# Patient Record
Sex: FEMALE | Race: WHITE | NOT HISPANIC OR LATINO | Employment: FULL TIME | URBAN - METROPOLITAN AREA
[De-identification: names, ages, dates, MRNs, and addresses within clinical notes are randomized per-mention and may not be internally consistent; named-entity substitution may affect disease eponyms.]

---

## 2023-10-16 PROBLEM — M18.11 PRIMARY OSTEOARTHRITIS OF FIRST CARPOMETACARPAL JOINT OF RIGHT HAND: Status: ACTIVE | Noted: 2018-01-09

## 2023-10-16 PROBLEM — N32.81 DETRUSOR INSTABILITY: Status: ACTIVE | Noted: 2017-03-28

## 2023-10-16 PROBLEM — R39.14 FEELING OF INCOMPLETE BLADDER EMPTYING: Status: ACTIVE | Noted: 2017-03-28

## 2023-10-16 RX ORDER — PANTOPRAZOLE SODIUM 40 MG/1
40 TABLET, DELAYED RELEASE ORAL
COMMUNITY
Start: 2015-11-25

## 2023-10-16 RX ORDER — LEVOTHYROXINE SODIUM 112 UG/1
112 TABLET ORAL
COMMUNITY
Start: 2016-09-16

## 2023-10-16 RX ORDER — DULOXETIN HYDROCHLORIDE 60 MG/1
60 CAPSULE, DELAYED RELEASE ORAL
COMMUNITY
Start: 2015-11-02

## 2023-10-16 RX ORDER — BACLOFEN 10 MG/1
10 TABLET ORAL
COMMUNITY
Start: 2015-11-17 | End: 2023-10-17 | Stop reason: WASHOUT

## 2023-10-16 RX ORDER — ATENOLOL AND CHLORTHALIDONE TABLET 50; 25 MG/1; MG/1
1 TABLET ORAL DAILY
COMMUNITY
Start: 2015-08-27

## 2023-10-17 ENCOUNTER — HOSPITAL ENCOUNTER (OUTPATIENT)
Dept: CARDIOLOGY | Facility: CLINIC | Age: 63
Discharge: HOME | End: 2023-10-17
Payer: COMMERCIAL

## 2023-10-17 ENCOUNTER — OFFICE VISIT (OUTPATIENT)
Dept: CARDIOLOGY | Facility: CLINIC | Age: 63
End: 2023-10-17
Payer: COMMERCIAL

## 2023-10-17 VITALS
OXYGEN SATURATION: 97 % | BODY MASS INDEX: 34.73 KG/M2 | DIASTOLIC BLOOD PRESSURE: 67 MMHG | HEIGHT: 63 IN | HEART RATE: 61 BPM | WEIGHT: 196 LBS | SYSTOLIC BLOOD PRESSURE: 127 MMHG

## 2023-10-17 DIAGNOSIS — R00.2 HEART PALPITATIONS: ICD-10-CM

## 2023-10-17 DIAGNOSIS — R06.09 EXERTIONAL DYSPNEA: Primary | ICD-10-CM

## 2023-10-17 DIAGNOSIS — I10 PRIMARY HYPERTENSION: ICD-10-CM

## 2023-10-17 DIAGNOSIS — R07.89 ATYPICAL CHEST PAIN: ICD-10-CM

## 2023-10-17 PROCEDURE — 99214 OFFICE O/P EST MOD 30 MIN: CPT | Performed by: STUDENT IN AN ORGANIZED HEALTH CARE EDUCATION/TRAINING PROGRAM

## 2023-10-17 PROCEDURE — 1036F TOBACCO NON-USER: CPT | Performed by: STUDENT IN AN ORGANIZED HEALTH CARE EDUCATION/TRAINING PROGRAM

## 2023-10-17 PROCEDURE — 3078F DIAST BP <80 MM HG: CPT | Performed by: STUDENT IN AN ORGANIZED HEALTH CARE EDUCATION/TRAINING PROGRAM

## 2023-10-17 PROCEDURE — 93244 EXT ECG>48HR<7D REV&INTERPJ: CPT | Performed by: INTERNAL MEDICINE

## 2023-10-17 PROCEDURE — 93242 EXT ECG>48HR<7D RECORDING: CPT

## 2023-10-17 PROCEDURE — 3074F SYST BP LT 130 MM HG: CPT | Performed by: STUDENT IN AN ORGANIZED HEALTH CARE EDUCATION/TRAINING PROGRAM

## 2023-10-17 RX ORDER — BISMUTH SUBSALICYLATE 262 MG
1 TABLET,CHEWABLE ORAL DAILY
COMMUNITY

## 2023-10-17 RX ORDER — ALLOPURINOL 100 MG/1
100 TABLET ORAL DAILY
COMMUNITY

## 2023-10-17 RX ORDER — REGADENOSON 0.08 MG/ML
0.4 INJECTION, SOLUTION INTRAVENOUS
Status: CANCELLED | OUTPATIENT
Start: 2023-10-17

## 2023-10-17 RX ORDER — IBUPROFEN 200 MG
200 TABLET ORAL EVERY 6 HOURS
COMMUNITY

## 2023-10-17 ASSESSMENT — ENCOUNTER SYMPTOMS
NEUROLOGICAL NEGATIVE: 1
SYNCOPE: 0
GASTROINTESTINAL NEGATIVE: 1
PND: 0
SHORTNESS OF BREATH: 1
ENDOCRINE NEGATIVE: 1
NEAR-SYNCOPE: 0
PALPITATIONS: 1
HEMATOLOGIC/LYMPHATIC NEGATIVE: 1
MUSCULOSKELETAL NEGATIVE: 1
CONSTITUTIONAL NEGATIVE: 1
ORTHOPNEA: 0
PSYCHIATRIC NEGATIVE: 1
DYSPNEA ON EXERTION: 1
EYES NEGATIVE: 1

## 2023-10-17 NOTE — PROGRESS NOTES
" Cardiology New Patient History and Physical    Reason for referral: Exertional dyspnea    HPI: Wendy Rosa is a 62 y.o.  female who presents today for exertional dyspnea. Past medical history of osteoarthritis of knee s/p right total knee arthroplasty, chronic lower back pain; L4 and L5 Laminectomy with L4 to S1 pedicle screw fusion (4/2017).     Patient presented to cardiology clinic on 10/17/23 .  Patient notes exertional dyspnea over past ~ 1 year. Worse with activity, relieved by rest; last only for 3-5 minutes.  Occasionally has \"pinching\" chest pain; no clear relation to dyspnea episodes.  Rare heart palpitations.  Per patient she was told she had a prior history of \"mitral valve issue\" on prior heart ultrasound.  Never a smoker.        Past Medical History:   She has a past medical history of Chronic lower back pain, Exertional shortness of breath, Osteoarthritis, and Primary hypertension.    Surgical History:   She has a past surgical history that includes Laminectomy (N/A); Wrist surgery; and Total knee arthroplasty (Right).    Family History:   Family History   Problem Relation Name Age of Onset    Other (enlarged heart) Mother      Other (pacemaker) Mother      Gin Parkinson White syndrome Son         Allergies:  Penicillins and Shellfish containing products     Social History:   - non-smoker; social alcohol use; no illicit drug use    Prior Cardiovascular Testing (personally reviewed):     Stress echo 8/2022- non-diagnostic; target heart rate not achieved; LVEF 60-65%    Review of Systems:  Review of Systems   Constitutional: Negative.   HENT: Negative.     Eyes: Negative.    Cardiovascular:  Positive for chest pain, dyspnea on exertion and palpitations. Negative for near-syncope, orthopnea, paroxysmal nocturnal dyspnea and syncope.   Respiratory:  Positive for shortness of breath.    Endocrine: Negative.    Hematologic/Lymphatic: Negative.    Skin: Negative.    Musculoskeletal: " "Negative.    Gastrointestinal: Negative.    Genitourinary: Negative.    Neurological: Negative.    Psychiatric/Behavioral: Negative.         Objective     Outpatient Medications:    Current Outpatient Medications:     allopurinol (Zyloprim) 100 mg tablet, Take 1 tablet (100 mg) by mouth once daily., Disp: , Rfl:     atenoloL-chlorthalidone (Tenoretic) 50-25 mg tablet, Take 1 tablet by mouth once daily., Disp: , Rfl:     DULoxetine (Cymbalta) 60 mg DR capsule, 1 capsule (60 mg)., Disp: , Rfl:     ibuprofen 200 mg tablet, Take 1 tablet (200 mg) by mouth every 6 hours., Disp: , Rfl:     levothyroxine (Synthroid) 112 mcg tablet, Take 1 tablet (112 mcg) by mouth., Disp: , Rfl:     multivitamin tablet, Take 1 tablet by mouth once daily., Disp: , Rfl:     pantoprazole (ProtoNix) 40 mg EC tablet, Take 1 tablet (40 mg) by mouth., Disp: , Rfl:      Last Recorded Vitals  /67 (BP Location: Left arm, Patient Position: Sitting, BP Cuff Size: Adult)   Pulse 61   Ht 1.6 m (5' 3\")   Wt 88.9 kg (196 lb)   SpO2 97%   BMI 34.72 kg/m²     Physical Exam:  Physical Exam  Constitutional:       Appearance: She is obese.   HENT:      Head: Normocephalic.      Mouth/Throat:      Mouth: Mucous membranes are moist.   Eyes:      Extraocular Movements: Extraocular movements intact.      Conjunctiva/sclera: Conjunctivae normal.   Neck:      Vascular: No carotid bruit.   Cardiovascular:      Rate and Rhythm: Normal rate. Rhythm irregular.      Pulses: Normal pulses.   Pulmonary:      Effort: Pulmonary effort is normal.      Breath sounds: Normal breath sounds. No rhonchi.   Abdominal:      General: Bowel sounds are normal.      Palpations: Abdomen is soft.   Musculoskeletal:      Right lower leg: No edema.      Left lower leg: No edema.   Skin:     General: Skin is warm and dry.   Neurological:      General: No focal deficit present.      Mental Status: She is alert.      Cranial Nerves: No cranial nerve deficit.      Motor: No weakness. "   Psychiatric:         Mood and Affect: Mood normal.         Behavior: Behavior normal.         Lab Review:    TSH (7/31/2023)- 0.533 (within normal limits)     Serum creatinine (7/2023)- 0.77    Lipid panel (7/14/2023)- total 186, HDL 39, , triglycerides 120 mg/dl    Assessment:   62 y.o.  female who presents today for exertional dyspnea. Past medical history of osteoarthritis of knee s/p right total knee arthroplasty, chronic lower back pain; L4 and L5 Laminectomy with L4 to S1 pedicle screw fusion (4/2017).     Patient with exertional dyspnea over past year with multiple CV risk factors (HTN, DLD, family hx CVD); recommend further evaluation with pharmacologic NM stress (unable to exercise vigorously due to knee osteoarthritis); patient agreeable.     Regarding palpitations/irregular heart beat per apple watch > 7 day holter monitor; check TTE.      Overall Plan:  1. Exertional dyspnea; atypical chest pain- pharmacology NM stress as above; TTE    2. Heart palpitations- 7 day holter monitor; TTE    3. DLD- goal LDL < 100 mg/dl; continue dietary and lifestyle modifications; consider statin therapy if not at goal    4. Primary Hypertension- continue anti-HTN therapy with atenoloL-chlorthalidone; if additional anti-HTN therapy needed consider amlodipine     Disposition: Return to cardiology clinic after above testing     Irvin Ruggiero MD

## 2023-10-17 NOTE — PATIENT INSTRUCTIONS
For your shortness of breath with activity; we will check a heart stress test (medicine based stress test as you are unable to exercise vigorously on a treadmill due to your knee arthritis)    For your heart palpitations we will check a heart monitor and heart ultrasound given your reported history of possible mitral valve issue.      Thank you for your visit today. Please contact our office (via [employer] or phone) with any additional questions.     [facility] [place]    Rosamaria, STACY/Clinic Nurse for:    Dr. Bahman Garcia    [address], [address]  [place] [address]    Phone: [phone] Press Option 5 then Option 3 to speak with the Clinic Nurse (Rosamaria)    _____    To Reach:    Billing Questions -    [phone]  Scheduling / Rescheduling -  Option 1  Refills / Medication Requests -  Option 3  General Office / [place] -  Option 4  Results -     Option 6  Medical Records -    Option 7  Repeat Options -    Option 9

## 2023-11-09 ENCOUNTER — HOSPITAL ENCOUNTER (OUTPATIENT)
Dept: RADIOLOGY | Facility: HOSPITAL | Age: 63
Discharge: HOME | End: 2023-11-09
Payer: COMMERCIAL

## 2023-11-09 ENCOUNTER — APPOINTMENT (OUTPATIENT)
Dept: CARDIOLOGY | Facility: HOSPITAL | Age: 63
End: 2023-11-09
Payer: COMMERCIAL

## 2023-11-09 ENCOUNTER — HOSPITAL ENCOUNTER (OUTPATIENT)
Dept: CARDIOLOGY | Facility: HOSPITAL | Age: 63
Discharge: HOME | End: 2023-11-09
Payer: COMMERCIAL

## 2023-11-09 DIAGNOSIS — R06.09 EXERTIONAL DYSPNEA: ICD-10-CM

## 2023-11-09 DIAGNOSIS — R07.89 ATYPICAL CHEST PAIN: ICD-10-CM

## 2023-11-09 PROCEDURE — 78452 HT MUSCLE IMAGE SPECT MULT: CPT | Performed by: STUDENT IN AN ORGANIZED HEALTH CARE EDUCATION/TRAINING PROGRAM

## 2023-11-09 PROCEDURE — 93017 CV STRESS TEST TRACING ONLY: CPT

## 2023-11-09 PROCEDURE — 3430000001 HC RX 343 DIAGNOSTIC RADIOPHARMACEUTICALS: Performed by: STUDENT IN AN ORGANIZED HEALTH CARE EDUCATION/TRAINING PROGRAM

## 2023-11-09 PROCEDURE — 78452 HT MUSCLE IMAGE SPECT MULT: CPT

## 2023-11-09 PROCEDURE — 2500000004 HC RX 250 GENERAL PHARMACY W/ HCPCS (ALT 636 FOR OP/ED): Performed by: STUDENT IN AN ORGANIZED HEALTH CARE EDUCATION/TRAINING PROGRAM

## 2023-11-09 PROCEDURE — 93016 CV STRESS TEST SUPVJ ONLY: CPT | Performed by: STUDENT IN AN ORGANIZED HEALTH CARE EDUCATION/TRAINING PROGRAM

## 2023-11-09 PROCEDURE — A9502 TC99M TETROFOSMIN: HCPCS | Performed by: STUDENT IN AN ORGANIZED HEALTH CARE EDUCATION/TRAINING PROGRAM

## 2023-11-09 PROCEDURE — 93018 CV STRESS TEST I&R ONLY: CPT | Performed by: STUDENT IN AN ORGANIZED HEALTH CARE EDUCATION/TRAINING PROGRAM

## 2023-11-09 PROCEDURE — 93016 CV STRESS TEST SUPVJ ONLY: CPT | Performed by: INTERNAL MEDICINE

## 2023-11-09 RX ORDER — REGADENOSON 0.08 MG/ML
0.4 INJECTION, SOLUTION INTRAVENOUS
Status: COMPLETED | OUTPATIENT
Start: 2023-11-09 | End: 2023-11-09

## 2023-11-09 RX ADMIN — TETROFOSMIN 11.1 MILLICURIE: 0.23 INJECTION, POWDER, LYOPHILIZED, FOR SOLUTION INTRAVENOUS at 08:55

## 2023-11-09 RX ADMIN — TETROFOSMIN 35.8 MILLICURIE: 0.23 INJECTION, POWDER, LYOPHILIZED, FOR SOLUTION INTRAVENOUS at 10:12

## 2023-11-09 RX ADMIN — REGADENOSON 0.4 MG: 0.08 INJECTION, SOLUTION INTRAVENOUS at 10:12

## 2023-11-21 ENCOUNTER — HOSPITAL ENCOUNTER (OUTPATIENT)
Dept: CARDIOLOGY | Facility: CLINIC | Age: 63
Discharge: HOME | End: 2023-11-21
Payer: COMMERCIAL

## 2023-11-21 DIAGNOSIS — R07.89 ATYPICAL CHEST PAIN: ICD-10-CM

## 2023-11-21 DIAGNOSIS — R06.09 EXERTIONAL DYSPNEA: ICD-10-CM

## 2023-11-21 DIAGNOSIS — R06.00 DYSPNEA, UNSPECIFIED: ICD-10-CM

## 2023-11-21 DIAGNOSIS — R00.2 HEART PALPITATIONS: ICD-10-CM

## 2023-11-21 PROCEDURE — 93306 TTE W/DOPPLER COMPLETE: CPT

## 2023-11-21 PROCEDURE — 93306 TTE W/DOPPLER COMPLETE: CPT | Performed by: STUDENT IN AN ORGANIZED HEALTH CARE EDUCATION/TRAINING PROGRAM

## 2023-11-22 LAB
EJECTION FRACTION APICAL 4 CHAMBER: 44.6
EJECTION FRACTION: 50
LEFT ATRIUM VOLUME AREA LENGTH INDEX BSA: 14.2
LEFT VENTRICLE INTERNAL DIMENSION DIASTOLE: 3.4 (ref 3.5–6)
LEFT VENTRICULAR OUTFLOW TRACT DIAMETER: 2
MITRAL VALVE E/A RATIO: 0.69
MITRAL VALVE E/E' RATIO: 7.76

## 2023-12-09 LAB — BODY SURFACE AREA: 1.99 M2

## 2023-12-12 ENCOUNTER — APPOINTMENT (OUTPATIENT)
Dept: CARDIOLOGY | Facility: CLINIC | Age: 63
End: 2023-12-12
Payer: COMMERCIAL

## 2023-12-13 PROBLEM — N95.2 ATROPHIC VAGINITIS: Status: ACTIVE | Noted: 2023-12-13

## 2023-12-13 PROBLEM — L94.0 LOCALIZED MORPHEA: Status: ACTIVE | Noted: 2023-12-13

## 2023-12-13 PROBLEM — F32.A DEPRESSIVE DISORDER: Status: ACTIVE | Noted: 2023-12-13

## 2023-12-13 PROBLEM — M51.9 LUMBAR DISC DISEASE: Status: ACTIVE | Noted: 2023-12-13

## 2023-12-13 PROBLEM — N39.46 MIXED INCONTINENCE: Status: ACTIVE | Noted: 2023-12-13

## 2023-12-13 PROBLEM — R20.2 PARESTHESIA OF RIGHT FOOT: Status: ACTIVE | Noted: 2023-12-13

## 2023-12-13 PROBLEM — M51.37 DEGENERATION OF LUMBAR OR LUMBOSACRAL INTERVERTEBRAL DISC: Status: ACTIVE | Noted: 2023-12-13

## 2023-12-13 PROBLEM — M54.32 SCIATICA OF LEFT SIDE WITHOUT BACK PAIN: Status: ACTIVE | Noted: 2023-12-13

## 2023-12-14 ENCOUNTER — OFFICE VISIT (OUTPATIENT)
Dept: CARDIOLOGY | Facility: CLINIC | Age: 63
End: 2023-12-14
Payer: COMMERCIAL

## 2023-12-14 VITALS
BODY MASS INDEX: 34.55 KG/M2 | SYSTOLIC BLOOD PRESSURE: 118 MMHG | HEART RATE: 70 BPM | WEIGHT: 195 LBS | DIASTOLIC BLOOD PRESSURE: 70 MMHG | HEIGHT: 63 IN | OXYGEN SATURATION: 94 %

## 2023-12-14 DIAGNOSIS — R06.09 EXERTIONAL DYSPNEA: ICD-10-CM

## 2023-12-14 DIAGNOSIS — R00.2 HEART PALPITATIONS: ICD-10-CM

## 2023-12-14 DIAGNOSIS — R07.89 ATYPICAL CHEST PAIN: ICD-10-CM

## 2023-12-14 DIAGNOSIS — I10 PRIMARY HYPERTENSION: ICD-10-CM

## 2023-12-14 PROCEDURE — 1159F MED LIST DOCD IN RCRD: CPT | Performed by: STUDENT IN AN ORGANIZED HEALTH CARE EDUCATION/TRAINING PROGRAM

## 2023-12-14 PROCEDURE — 99213 OFFICE O/P EST LOW 20 MIN: CPT | Performed by: STUDENT IN AN ORGANIZED HEALTH CARE EDUCATION/TRAINING PROGRAM

## 2023-12-14 PROCEDURE — 3078F DIAST BP <80 MM HG: CPT | Performed by: STUDENT IN AN ORGANIZED HEALTH CARE EDUCATION/TRAINING PROGRAM

## 2023-12-14 PROCEDURE — 1160F RVW MEDS BY RX/DR IN RCRD: CPT | Performed by: STUDENT IN AN ORGANIZED HEALTH CARE EDUCATION/TRAINING PROGRAM

## 2023-12-14 PROCEDURE — 3074F SYST BP LT 130 MM HG: CPT | Performed by: STUDENT IN AN ORGANIZED HEALTH CARE EDUCATION/TRAINING PROGRAM

## 2023-12-14 PROCEDURE — 1036F TOBACCO NON-USER: CPT | Performed by: STUDENT IN AN ORGANIZED HEALTH CARE EDUCATION/TRAINING PROGRAM

## 2023-12-14 NOTE — PROGRESS NOTES
" Cardiology Outpatient Follow-up Visit    Reason for visit: follow-up for exertional dyspnea    HPI: Wendy Rosa is a 63 y.o.  female who presents today for a follow-up for exertional dyspnea. Past medical history of osteoarthritis of knee s/p right total knee arthroplasty, chronic lower back pain; L4 and L5 Laminectomy with L4 to S1 pedicle screw fusion (4/2017).     Patient presented to cardiology clinic on 12/14/23 .  Patient notes exertional dyspnea over past ~ 1 year. Worse with activity, relieved by rest; last only for 3-5 minutes.  Occasionally has \"pinching\" chest pain; no clear relation to dyspnea episodes.  Rare heart palpitations.  Per patient she was told she had a prior history of \"mitral valve issue\" on prior heart ultrasound.  Never a smoker.      Patient with exertional dyspnea over past year with multiple CV risk factors (HTN, DLD, family hx CVD); recommended further evaluation with pharmacologic NM stress (unable to exercise vigorously due to knee osteoarthritis); patient agreeable.  Regarding palpitations/irregular heart beat per apple watch > 7 day holter monitor; check TTE.      Wendy returned to cardiology clinic on 12/14/2023.  Stress testing was low risk for ischemia. No significant arrhythmias seen on heart monitor.  TTE showed mild to moderate LVH.  Patient without active cardiovascular complaints at this time.      Past Medical History:   She has a past medical history of Chronic lower back pain, Exertional shortness of breath, Osteoarthritis, and Primary hypertension.    Surgical History:   She has a past surgical history that includes Laminectomy (N/A); Wrist surgery; and Total knee arthroplasty (Right).    Family History:   Family History   Problem Relation Name Age of Onset    Other (enlarged heart) Mother      Other (pacemaker) Mother      Gin Parkinson White syndrome Son         Allergies:  Penicillins and Shellfish containing products     Social History:   - " non-smoker; social alcohol use; no illicit drug use    Prior Cardiovascular Testing (personally reviewed):       TTE (11/21/2023)   1. Left ventricular systolic function is hyperdynamic with a 70-75% estimated ejection fraction.   2. Moderately increased left ventricular septal thickness.   3. The left ventricular posterior wall thickness is moderately increased.   4. Spectral Doppler shows an impaired relaxation pattern of left ventricular diastolic filling.    NM stress (11/9/2023)  1. Negative myocardial perfusion study without evidence of inducible myocardial ischemia or prior infarction.  2. The left ventricle is normal in size.  3. Normal LV wall motion with a post-stress LV EF estimated greater than 65%.    Holter Monitor (10/17/2023):  The predominant rhythm during this holter recording is sinus rhythm with a minimum heart rate of 47 bpm, maximum heart rate 140 bpm, and average heart rate 78 bpm.  No episodes of atrial fibrillation or PSVT.  No episodes of high grade AV block.  No ventricular tachycardia.    Stress echo 8/2022- non-diagnostic; target heart rate not achieved; LVEF 60-65%    Review of Systems:  Review of Systems   Constitutional: Negative.   HENT: Negative.     Eyes: Negative.    Cardiovascular:  Negative for chest pain, dyspnea on exertion, near-syncope, orthopnea, palpitations, paroxysmal nocturnal dyspnea and syncope.   Respiratory: Negative.     Endocrine: Negative.    Hematologic/Lymphatic: Negative.    Skin: Negative.    Musculoskeletal: Negative.    Gastrointestinal: Negative.    Genitourinary: Negative.    Neurological: Negative.    Psychiatric/Behavioral: Negative.     Allergic/Immunologic: Negative.        Objective     Outpatient Medications:    Current Outpatient Medications:     allopurinol (Zyloprim) 100 mg tablet, Take 1 tablet (100 mg) by mouth once daily., Disp: , Rfl:     atenoloL-chlorthalidone (Tenoretic) 50-25 mg tablet, Take 1 tablet by mouth once daily., Disp: , Rfl:      "DULoxetine (Cymbalta) 60 mg DR capsule, 1 capsule (60 mg)., Disp: , Rfl:     ibuprofen 200 mg tablet, Take 1 tablet (200 mg) by mouth every 6 hours., Disp: , Rfl:     levothyroxine (Synthroid) 112 mcg tablet, Take 1 tablet (112 mcg) by mouth., Disp: , Rfl:     multivitamin tablet, Take 1 tablet by mouth once daily., Disp: , Rfl:     pantoprazole (ProtoNix) 40 mg EC tablet, Take 1 tablet (40 mg) by mouth., Disp: , Rfl:      Last Recorded Vitals  /70 (BP Location: Right arm, Patient Position: Sitting, BP Cuff Size: Adult)   Pulse 70   Ht 1.6 m (5' 3\")   Wt 88.5 kg (195 lb)   SpO2 94%   BMI 34.54 kg/m²     Physical Exam:  Physical Exam  Constitutional:       Appearance: She is obese.   HENT:      Head: Normocephalic.      Mouth/Throat:      Mouth: Mucous membranes are moist.   Eyes:      Extraocular Movements: Extraocular movements intact.      Conjunctiva/sclera: Conjunctivae normal.   Neck:      Vascular: No carotid bruit.   Cardiovascular:      Rate and Rhythm: Normal rate. Rhythm irregular.      Pulses: Normal pulses.   Pulmonary:      Effort: Pulmonary effort is normal.      Breath sounds: Normal breath sounds. No rhonchi.   Abdominal:      General: Bowel sounds are normal.      Palpations: Abdomen is soft.   Musculoskeletal:      Right lower leg: No edema.      Left lower leg: No edema.   Skin:     General: Skin is warm and dry.   Neurological:      General: No focal deficit present.      Mental Status: She is alert.      Cranial Nerves: No cranial nerve deficit.      Motor: No weakness.   Psychiatric:         Mood and Affect: Mood normal.         Behavior: Behavior normal.       Lab Review:    TSH (7/31/2023)- 0.533 (within normal limits)     Serum creatinine (7/2023)- 0.77    Lipid panel (7/14/2023)- total 186, HDL 39, , triglycerides 120 mg/dl    Assessment:   62 y.o.  female who presents today for a follow-up visit. Past medical history of osteoarthritis of knee s/p right total knee " arthroplasty, chronic lower back pain; L4 and L5 Laminectomy with L4 to S1 pedicle screw fusion (4/2017).     Patient with exertional dyspnea over past year with multiple CV risk factors (HTN, DLD, family hx CVD); recommend further evaluation with pharmacologic NM stress (unable to exercise vigorously due to knee osteoarthritis); patient agreeable.  Regarding palpitations/irregular heart beat per apple watch > 7 day holter monitor; check TTE.      Wendy returned to cardiology clinic on 12/14/2023.  Stress testing was low risk for ischemia. No significant arrhythmias seen on heart monitor.  TTE showed mild to moderate LVH.  Patient without active cardiovascular complaints at this time.      Overall Plan:  1. Exertional dyspnea; atypical chest pain (resolved)- stress testing was low risk for ischemia; no active complaints (12/14/2023); no further testing recommended at this time    2. Heart palpitations- 7 day holter monitor showed no significant arrhythmias; patient currently asymptomatic; consider trial of beta blockade if symptoms poorly controlled, will defer for now    3. DLD- goal LDL < 100 mg/dl; continue dietary and lifestyle modifications; consider statin therapy if not at goal    4. Primary Hypertension (goal BP < 130/90 mmHg)- currently well-controlled; continue anti-HTN therapy with atenoloL-chlorthalidone; if additional anti-HTN therapy needed consider amlodipine     Disposition: Return to cardiology clinic as needed    Irvin Ruggiero MD

## 2023-12-14 NOTE — PATIENT INSTRUCTIONS
Your heart stress test and monitor looked normal. Your heart ultrasound showed normal heart strength; mid to moderate muscle thickening. No additional heart testing recommended at this time.     Follow-up as needed in cardiology clinic     Thank you for your visit today. Please contact our office (via [email or web address] or phone) with any additional questions.     [facility] [place]    Rosamaria, STACY/Clinic Nurse for:    Dr. Bahman Garcia    [address], [address]  [place] [address]    Phone: [phone] Press Option 5 then Option 3 to speak with the Clinic Nurse (Rosamaria)    _____    To Reach:    Billing Questions -    [phone]  Scheduling / Rescheduling -  Option 1  Refills / Medication Requests -  Option 3  General Office /  -  Option 4  Results -     Option 6  Medical Records -    Option 7  Repeat Options -    Option 9

## 2023-12-27 ASSESSMENT — ENCOUNTER SYMPTOMS
PND: 0
PSYCHIATRIC NEGATIVE: 1
EYES NEGATIVE: 1
ORTHOPNEA: 0
NEUROLOGICAL NEGATIVE: 1
GASTROINTESTINAL NEGATIVE: 1
MUSCULOSKELETAL NEGATIVE: 1
CONSTITUTIONAL NEGATIVE: 1
PALPITATIONS: 0
DYSPNEA ON EXERTION: 0
ALLERGIC/IMMUNOLOGIC NEGATIVE: 1
RESPIRATORY NEGATIVE: 1
NEAR-SYNCOPE: 0
ENDOCRINE NEGATIVE: 1
SYNCOPE: 0
HEMATOLOGIC/LYMPHATIC NEGATIVE: 1